# Patient Record
Sex: MALE | Race: BLACK OR AFRICAN AMERICAN | NOT HISPANIC OR LATINO | URBAN - METROPOLITAN AREA
[De-identification: names, ages, dates, MRNs, and addresses within clinical notes are randomized per-mention and may not be internally consistent; named-entity substitution may affect disease eponyms.]

---

## 2017-03-01 ENCOUNTER — OUTPATIENT (OUTPATIENT)
Dept: OUTPATIENT SERVICES | Facility: HOSPITAL | Age: 32
LOS: 1 days | End: 2017-03-01
Payer: MEDICAID

## 2017-03-06 DIAGNOSIS — R69 ILLNESS, UNSPECIFIED: ICD-10-CM

## 2017-09-01 PROCEDURE — G9001: CPT

## 2018-09-01 ENCOUNTER — INPATIENT (INPATIENT)
Facility: HOSPITAL | Age: 33
LOS: 54 days | Discharge: HOME | End: 2018-10-26
Attending: PSYCHIATRY & NEUROLOGY | Admitting: PSYCHIATRY & NEUROLOGY
Payer: MEDICAID

## 2018-09-01 VITALS
OXYGEN SATURATION: 99 % | SYSTOLIC BLOOD PRESSURE: 143 MMHG | DIASTOLIC BLOOD PRESSURE: 94 MMHG | TEMPERATURE: 98 F | RESPIRATION RATE: 18 BRPM | HEART RATE: 82 BPM

## 2018-09-01 DIAGNOSIS — R69 ILLNESS, UNSPECIFIED: ICD-10-CM

## 2018-09-01 DIAGNOSIS — F25.0 SCHIZOAFFECTIVE DISORDER, BIPOLAR TYPE: ICD-10-CM

## 2018-09-01 LAB
ALBUMIN SERPL ELPH-MCNC: 4 G/DL — SIGNIFICANT CHANGE UP (ref 3.3–5)
ALP SERPL-CCNC: 135 U/L — HIGH (ref 40–120)
ALT FLD-CCNC: 30 U/L — SIGNIFICANT CHANGE UP (ref 4–41)
AMPHET UR-MCNC: NEGATIVE — SIGNIFICANT CHANGE UP
APAP SERPL-MCNC: < 15 UG/ML — LOW (ref 15–25)
APPEARANCE UR: CLEAR — SIGNIFICANT CHANGE UP
AST SERPL-CCNC: 36 U/L — SIGNIFICANT CHANGE UP (ref 4–40)
BARBITURATES UR SCN-MCNC: NEGATIVE — SIGNIFICANT CHANGE UP
BASOPHILS # BLD AUTO: 0.03 K/UL — SIGNIFICANT CHANGE UP (ref 0–0.2)
BASOPHILS NFR BLD AUTO: 0.5 % — SIGNIFICANT CHANGE UP (ref 0–2)
BENZODIAZ UR-MCNC: NEGATIVE — SIGNIFICANT CHANGE UP
BILIRUB SERPL-MCNC: < 0.2 MG/DL — LOW (ref 0.2–1.2)
BILIRUB UR-MCNC: NEGATIVE — SIGNIFICANT CHANGE UP
BLOOD UR QL VISUAL: NEGATIVE — SIGNIFICANT CHANGE UP
BUN SERPL-MCNC: 16 MG/DL — SIGNIFICANT CHANGE UP (ref 7–23)
CALCIUM SERPL-MCNC: 8.9 MG/DL — SIGNIFICANT CHANGE UP (ref 8.4–10.5)
CANNABINOIDS UR-MCNC: NEGATIVE — SIGNIFICANT CHANGE UP
CHLORIDE SERPL-SCNC: 106 MMOL/L — SIGNIFICANT CHANGE UP (ref 98–107)
CO2 SERPL-SCNC: 22 MMOL/L — SIGNIFICANT CHANGE UP (ref 22–31)
COCAINE METAB.OTHER UR-MCNC: NEGATIVE — SIGNIFICANT CHANGE UP
COLOR SPEC: SIGNIFICANT CHANGE UP
CREAT SERPL-MCNC: 0.92 MG/DL — SIGNIFICANT CHANGE UP (ref 0.5–1.3)
EOSINOPHIL # BLD AUTO: 0.14 K/UL — SIGNIFICANT CHANGE UP (ref 0–0.5)
EOSINOPHIL NFR BLD AUTO: 2.4 % — SIGNIFICANT CHANGE UP (ref 0–6)
ETHANOL BLD-MCNC: < 10 MG/DL — SIGNIFICANT CHANGE UP
GLUCOSE SERPL-MCNC: 104 MG/DL — HIGH (ref 70–99)
GLUCOSE UR-MCNC: NEGATIVE — SIGNIFICANT CHANGE UP
HCT VFR BLD CALC: 39.2 % — SIGNIFICANT CHANGE UP (ref 39–50)
HGB BLD-MCNC: 12.4 G/DL — LOW (ref 13–17)
IMM GRANULOCYTES # BLD AUTO: 0.01 # — SIGNIFICANT CHANGE UP
IMM GRANULOCYTES NFR BLD AUTO: 0.2 % — SIGNIFICANT CHANGE UP (ref 0–1.5)
KETONES UR-MCNC: NEGATIVE — SIGNIFICANT CHANGE UP
LEUKOCYTE ESTERASE UR-ACNC: NEGATIVE — SIGNIFICANT CHANGE UP
LYMPHOCYTES # BLD AUTO: 2.43 K/UL — SIGNIFICANT CHANGE UP (ref 1–3.3)
LYMPHOCYTES # BLD AUTO: 40.8 % — SIGNIFICANT CHANGE UP (ref 13–44)
MCHC RBC-ENTMCNC: 26.4 PG — LOW (ref 27–34)
MCHC RBC-ENTMCNC: 31.6 % — LOW (ref 32–36)
MCV RBC AUTO: 83.6 FL — SIGNIFICANT CHANGE UP (ref 80–100)
METHADONE UR-MCNC: NEGATIVE — SIGNIFICANT CHANGE UP
MONOCYTES # BLD AUTO: 0.61 K/UL — SIGNIFICANT CHANGE UP (ref 0–0.9)
MONOCYTES NFR BLD AUTO: 10.3 % — SIGNIFICANT CHANGE UP (ref 2–14)
NEUTROPHILS # BLD AUTO: 2.73 K/UL — SIGNIFICANT CHANGE UP (ref 1.8–7.4)
NEUTROPHILS NFR BLD AUTO: 45.8 % — SIGNIFICANT CHANGE UP (ref 43–77)
NITRITE UR-MCNC: NEGATIVE — SIGNIFICANT CHANGE UP
NRBC # FLD: 0 — SIGNIFICANT CHANGE UP
OPIATES UR-MCNC: NEGATIVE — SIGNIFICANT CHANGE UP
OXYCODONE UR-MCNC: NEGATIVE — SIGNIFICANT CHANGE UP
PCP UR-MCNC: NEGATIVE — SIGNIFICANT CHANGE UP
PH UR: 6 — SIGNIFICANT CHANGE UP (ref 5–8)
PLATELET # BLD AUTO: 283 K/UL — SIGNIFICANT CHANGE UP (ref 150–400)
PMV BLD: 10.6 FL — SIGNIFICANT CHANGE UP (ref 7–13)
POTASSIUM SERPL-MCNC: 4.2 MMOL/L — SIGNIFICANT CHANGE UP (ref 3.5–5.3)
POTASSIUM SERPL-SCNC: 4.2 MMOL/L — SIGNIFICANT CHANGE UP (ref 3.5–5.3)
PROT SERPL-MCNC: 7.7 G/DL — SIGNIFICANT CHANGE UP (ref 6–8.3)
PROT UR-MCNC: NEGATIVE — SIGNIFICANT CHANGE UP
RBC # BLD: 4.69 M/UL — SIGNIFICANT CHANGE UP (ref 4.2–5.8)
RBC # FLD: 15.6 % — HIGH (ref 10.3–14.5)
SALICYLATES SERPL-MCNC: < 5 MG/DL — LOW (ref 15–30)
SODIUM SERPL-SCNC: 142 MMOL/L — SIGNIFICANT CHANGE UP (ref 135–145)
SP GR SPEC: 1.02 — SIGNIFICANT CHANGE UP (ref 1–1.04)
TSH SERPL-MCNC: 1.51 UIU/ML — SIGNIFICANT CHANGE UP (ref 0.27–4.2)
UROBILINOGEN FLD QL: NORMAL — SIGNIFICANT CHANGE UP
WBC # BLD: 5.95 K/UL — SIGNIFICANT CHANGE UP (ref 3.8–10.5)
WBC # FLD AUTO: 5.95 K/UL — SIGNIFICANT CHANGE UP (ref 3.8–10.5)

## 2018-09-01 PROCEDURE — 99285 EMERGENCY DEPT VISIT HI MDM: CPT

## 2018-09-01 RX ORDER — CEPHALEXIN 500 MG
500 CAPSULE ORAL EVERY 12 HOURS
Qty: 0 | Refills: 0 | Status: DISCONTINUED | OUTPATIENT
Start: 2018-09-01 | End: 2018-09-01

## 2018-09-01 NOTE — ED BEHAVIORAL HEALTH ASSESSMENT NOTE - PAST PSYCHOTROPIC MEDICATION
Invega sustena 234mg, Risperdal Consta; long history of refusing oral meds like zyprexa, risperdal, depakote
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
## 2018-09-01 NOTE — ED BEHAVIORAL HEALTH ASSESSMENT NOTE - AXIS IV
Problem related to social environment/Educational problems/Occupational problems/Problems with access to healthcare services

## 2018-09-01 NOTE — ED BEHAVIORAL HEALTH ASSESSMENT NOTE - PSYCHIATRIC ISSUES AND PLAN (INCLUDE STANDING AND PRN MEDICATION)
psychosis: start risperidone 1mg hs, consider restarting Invega COLLINS, Haldol 5mg PO q 6 hrs prn agitation; Ativan 2mg PO q 6hrs prn anxiety/agitation, Diphenhydramine 50mg PO q 6hr prn EPS, insomnia or agitation.      Haldol 5mg IM; Ativan 2mg IM, Diphenhydramine 50mg IM once prn severe agitation (combativeness, assaultive behavior) after notification of a prescribing clinician.

## 2018-09-01 NOTE — ED PROVIDER NOTE - PROGRESS NOTE DETAILS
CATHIE MARINO MD: This patient was never directly seen by me.  I never performed a face to face HPI, ROS, PMHX or PE.  I was not involved in the MDM or the final disposition of the patient.  I was available for consultation during at least a portion of the patients stay in the Emergency Department.  I am co-signing the note of the nurse practitioner as per hospital protocol.
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
## 2018-09-01 NOTE — ED ADULT NURSE NOTE - CHIEF COMPLAINT QUOTE
Pt ambulatory to triage via [employer] as per EMT" His mother called reporting he has schizophrenia and is non compliant with med he is also delusional." Pt calm and cooperative in triage, axo3 calm and cooperative pt st" I stopped taking meds because I graduated from [facility] program and they said I could stopped." Denies drug etoh use. denies si/Hi.

## 2018-09-01 NOTE — ED BEHAVIORAL HEALTH ASSESSMENT NOTE - RISK ASSESSMENT
He is at high risk of danger of to self and others secondary to acute psychosis and recent aggression, poor sleep and medication non compliance, he requires inpatient psychiatric hospitalization for treatment and stabilization, will be admitted 9.39. No need for 1;1 CO and no suicidal or homicidal intent. Transfer to [facility] with EMS and buckle guard, to be removed on arrival.

## 2018-09-01 NOTE — ED PROVIDER NOTE - OBJECTIVE STATEMENT
32 y/o M hx  Schizophrenia, Bipolar   BIBA  malodorous w c/o increase anxiousness , aggression and bizarre behaviour secondary to medication non compliance. Patient  appears paranoid and disorganized. Admits to medication non compliance.   Denies falling, punching or kicking any objects. Denies SI/HI/AH/VH. Denies  pain, dizziness,  SOB , fever, chills, chest/abdominal   discomfort.  Denies recent use of  alcohol or illicit drugs.

## 2018-09-01 NOTE — ED BEHAVIORAL HEALTH ASSESSMENT NOTE - DESCRIPTION
pt calm and cooperative in the ER. obesity pt is single, unemployed, no children, resides with his family. in good behavioral control  ICU Vital Signs Last 24 Hrs  T(C): 36.6 (01 Sep 2018 20:54), Max: 36.6 (01 Sep 2018 20:54)  T(F): 97.8 (01 Sep 2018 20:54), Max: 97.8 (01 Sep 2018 20:54)  HR: 82 (01 Sep 2018 20:54) (82 - 82)  BP: 143/94 (01 Sep 2018 20:54) (143/94 - 143/94)  BP(mean): --  ABP: --  ABP(mean): --  RR: 18 (01 Sep 2018 20:54) (18 - 18)  SpO2: 99% (01 Sep 2018 20:54) (99% - 99%)

## 2018-09-01 NOTE — ED BEHAVIORAL HEALTH ASSESSMENT NOTE - OTHER PAST PSYCHIATRIC HISTORY (INCLUDE DETAILS REGARDING ONSET, COURSE OF ILLNESS, INPATIENT/OUTPATIENT TREATMENT)
hospitalized 6 times; last time was at [facility] in 6/09 (also at [facility], [facility], and [place],). First hospitalized in 2008; 3 hospitalizations within 2 months during summer 2009. Attended  [facility] and saw Dr. Pedraza (psychiatrist) & dr. Barton (psychologist) x 2 yrs.  Patient attended day program at [facility] following last psych admission, but did not pursue as he felt patient's were too low functioning. Has been followed by ACT Team [phone] / [phone]  - no hx of suicidality / suicide attempts / substance abuse / aggression / violence / legal issues  - hx of ICM from [facility]; hx of attending [facility] hospitalized 6 times; last time was at [facility] in 6/09 (also at [facility], [facility], and [place],). First hospitalized in 2008; 3 hospitalizations within 2 months during summer 2009. Attended  [facility] and saw Dr. Pedraza (psychiatrist) & dr. Barton (psychologist) x 2 yrs.  Patient attended day program at [facility] following last psych admission, but did not pursue as he felt patient's were too low functioning. Has been followed by ACT Team [phone] / [phone]  - no hx of suicidality / suicide attempts / substance abuse   - hx of ICM from [facility]; hx of attending [facility]

## 2018-09-01 NOTE — ED PROVIDER NOTE - MEDICAL DECISION MAKING DETAILS
34 y/o M hx  Schizophrenia, Bipolar   Labs, Urine Tox/UA, EKG.   Medical evaluation performed. There is no clinical evidence of intoxication or any acute medical problem requiring immediate intervention. Patient is awaiting psychiatric consultation. Final disposition will be determined by psychiatrist. Recommend Inpatient Psychiatric admission
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
## 2018-09-01 NOTE — ED ADULT TRIAGE NOTE - CHIEF COMPLAINT QUOTE
Pt ambulatory to triage via [employer] as per EMT" His mother called reporting he has schizophrenia and is non compliant with med he is also delusional." Pt calm and cooperative in triage, axo3 calm and cooperative pt st" I stopped taking meds because I graduated from [facility] program and they said I could stopped." Denies drug etoh use. denies si/Hi.

## 2018-09-02 DIAGNOSIS — F25.0 SCHIZOAFFECTIVE DISORDER, BIPOLAR TYPE: ICD-10-CM

## 2018-09-02 PROCEDURE — 99222 1ST HOSP IP/OBS MODERATE 55: CPT

## 2018-09-02 RX ORDER — RISPERIDONE 4 MG/1
2 TABLET ORAL AT BEDTIME
Qty: 0 | Refills: 0 | Status: DISCONTINUED | OUTPATIENT
Start: 2018-09-02 | End: 2018-09-12

## 2018-09-02 RX ORDER — RISPERIDONE 4 MG/1
1 TABLET ORAL AT BEDTIME
Qty: 0 | Refills: 0 | Status: DISCONTINUED | OUTPATIENT
Start: 2018-09-02 | End: 2018-09-02

## 2018-09-02 RX ORDER — HALOPERIDOL DECANOATE 100 MG/ML
5 INJECTION INTRAMUSCULAR ONCE
Qty: 0 | Refills: 0 | Status: DISCONTINUED | OUTPATIENT
Start: 2018-09-02 | End: 2018-10-26

## 2018-09-02 RX ORDER — HALOPERIDOL DECANOATE 100 MG/ML
5 INJECTION INTRAMUSCULAR EVERY 6 HOURS
Qty: 0 | Refills: 0 | Status: DISCONTINUED | OUTPATIENT
Start: 2018-09-02 | End: 2018-10-26

## 2018-09-02 RX ORDER — DIPHENHYDRAMINE HCL 50 MG
50 CAPSULE ORAL ONCE
Qty: 0 | Refills: 0 | Status: DISCONTINUED | OUTPATIENT
Start: 2018-09-02 | End: 2018-10-26

## 2018-09-02 RX ORDER — DIPHENHYDRAMINE HCL 50 MG
50 CAPSULE ORAL EVERY 6 HOURS
Qty: 0 | Refills: 0 | Status: DISCONTINUED | OUTPATIENT
Start: 2018-09-02 | End: 2018-10-26

## 2018-09-02 RX ORDER — DIPHENHYDRAMINE HCL 50 MG
50 CAPSULE ORAL EVERY 4 HOURS
Qty: 0 | Refills: 0 | Status: DISCONTINUED | OUTPATIENT
Start: 2018-09-02 | End: 2018-10-26

## 2018-09-02 RX ADMIN — Medication 50 MILLIGRAM(S): at 20:27

## 2018-09-03 PROCEDURE — 99232 SBSQ HOSP IP/OBS MODERATE 35: CPT

## 2018-09-04 RX ORDER — DIPHENHYDRAMINE HCL 50 MG
50 CAPSULE ORAL ONCE
Qty: 0 | Refills: 0 | Status: COMPLETED | OUTPATIENT
Start: 2018-09-04 | End: 2018-09-04

## 2018-09-04 RX ADMIN — Medication 50 MILLIGRAM(S): at 20:39

## 2018-09-05 RX ADMIN — Medication 50 MILLIGRAM(S): at 21:17

## 2018-09-06 RX ADMIN — Medication 50 MILLIGRAM(S): at 20:21

## 2018-09-07 RX ADMIN — Medication 50 MILLIGRAM(S): at 21:15

## 2018-09-08 RX ORDER — ACETAMINOPHEN 500 MG
650 TABLET ORAL ONCE
Qty: 0 | Refills: 0 | Status: COMPLETED | OUTPATIENT
Start: 2018-09-08 | End: 2018-09-08

## 2018-09-08 RX ADMIN — Medication 50 MILLIGRAM(S): at 21:04

## 2018-09-08 RX ADMIN — Medication 650 MILLIGRAM(S): at 22:45

## 2018-09-08 RX ADMIN — Medication 650 MILLIGRAM(S): at 21:44

## 2018-09-09 RX ORDER — BENZOCAINE AND MENTHOL 5; 1 G/100ML; G/100ML
1 LIQUID ORAL
Qty: 0 | Refills: 0 | Status: DISCONTINUED | OUTPATIENT
Start: 2018-09-09 | End: 2018-10-26

## 2018-09-09 RX ADMIN — Medication 200 MILLIGRAM(S): at 18:37

## 2018-09-09 RX ADMIN — Medication 200 MILLIGRAM(S): at 23:00

## 2018-09-09 RX ADMIN — Medication 200 MILLIGRAM(S): at 08:08

## 2018-09-09 RX ADMIN — BENZOCAINE AND MENTHOL 1 LOZENGE: 5; 1 LIQUID ORAL at 12:02

## 2018-09-09 RX ADMIN — BENZOCAINE AND MENTHOL 1 LOZENGE: 5; 1 LIQUID ORAL at 08:08

## 2018-09-09 RX ADMIN — Medication 200 MILLIGRAM(S): at 12:02

## 2018-09-09 RX ADMIN — BENZOCAINE AND MENTHOL 1 LOZENGE: 5; 1 LIQUID ORAL at 18:37

## 2018-09-09 RX ADMIN — BENZOCAINE AND MENTHOL 1 LOZENGE: 5; 1 LIQUID ORAL at 21:12

## 2018-09-10 RX ADMIN — BENZOCAINE AND MENTHOL 1 LOZENGE: 5; 1 LIQUID ORAL at 05:00

## 2018-09-10 RX ADMIN — BENZOCAINE AND MENTHOL 1 LOZENGE: 5; 1 LIQUID ORAL at 14:55

## 2018-09-10 RX ADMIN — Medication 50 MILLIGRAM(S): at 20:37

## 2018-09-10 RX ADMIN — BENZOCAINE AND MENTHOL 1 LOZENGE: 5; 1 LIQUID ORAL at 20:37

## 2018-09-10 RX ADMIN — Medication 200 MILLIGRAM(S): at 20:37

## 2018-09-10 RX ADMIN — Medication 200 MILLIGRAM(S): at 05:00

## 2018-09-11 RX ADMIN — Medication 50 MILLIGRAM(S): at 20:13

## 2018-09-11 RX ADMIN — Medication 200 MILLIGRAM(S): at 20:14

## 2018-09-12 RX ORDER — ARIPIPRAZOLE 15 MG/1
5 TABLET ORAL DAILY
Qty: 0 | Refills: 0 | Status: DISCONTINUED | OUTPATIENT
Start: 2018-09-12 | End: 2018-09-20

## 2018-09-13 RX ADMIN — Medication 50 MILLIGRAM(S): at 22:16

## 2018-09-19 RX ADMIN — Medication 50 MILLIGRAM(S): at 20:36

## 2018-09-20 PROCEDURE — 99232 SBSQ HOSP IP/OBS MODERATE 35: CPT

## 2018-09-20 RX ORDER — TUBERCULIN PURIFIED PROTEIN DERIVATIVE 5 [IU]/.1ML
5 INJECTION, SOLUTION INTRADERMAL ONCE
Qty: 0 | Refills: 0 | Status: COMPLETED | OUTPATIENT
Start: 2018-09-20 | End: 2018-09-22

## 2018-09-20 RX ORDER — ARIPIPRAZOLE 15 MG/1
10 TABLET ORAL DAILY
Qty: 0 | Refills: 0 | Status: DISCONTINUED | OUTPATIENT
Start: 2018-09-20 | End: 2018-09-21

## 2018-09-20 RX ADMIN — TUBERCULIN PURIFIED PROTEIN DERIVATIVE 5 UNIT(S): 5 INJECTION, SOLUTION INTRADERMAL at 13:03

## 2018-09-20 RX ADMIN — ARIPIPRAZOLE 5 MILLIGRAM(S): 15 TABLET ORAL at 11:37

## 2018-09-21 PROCEDURE — 99232 SBSQ HOSP IP/OBS MODERATE 35: CPT

## 2018-09-21 RX ORDER — ARIPIPRAZOLE 15 MG/1
15 TABLET ORAL DAILY
Qty: 0 | Refills: 0 | Status: DISCONTINUED | OUTPATIENT
Start: 2018-09-22 | End: 2018-09-24

## 2018-09-21 RX ADMIN — ARIPIPRAZOLE 10 MILLIGRAM(S): 15 TABLET ORAL at 08:17

## 2018-09-22 RX ADMIN — ARIPIPRAZOLE 15 MILLIGRAM(S): 15 TABLET ORAL at 08:12

## 2018-09-22 RX ADMIN — TUBERCULIN PURIFIED PROTEIN DERIVATIVE 5 UNIT(S): 5 INJECTION, SOLUTION INTRADERMAL at 15:13

## 2018-09-23 RX ADMIN — Medication 50 MILLIGRAM(S): at 20:07

## 2018-09-23 RX ADMIN — Medication 50 MILLIGRAM(S): at 03:19

## 2018-09-23 RX ADMIN — ARIPIPRAZOLE 15 MILLIGRAM(S): 15 TABLET ORAL at 08:10

## 2018-09-24 PROCEDURE — 99232 SBSQ HOSP IP/OBS MODERATE 35: CPT

## 2018-09-24 RX ORDER — ARIPIPRAZOLE 15 MG/1
20 TABLET ORAL DAILY
Qty: 0 | Refills: 0 | Status: DISCONTINUED | OUTPATIENT
Start: 2018-09-25 | End: 2018-10-19

## 2018-09-24 RX ADMIN — ARIPIPRAZOLE 15 MILLIGRAM(S): 15 TABLET ORAL at 08:14

## 2018-09-24 RX ADMIN — Medication 50 MILLIGRAM(S): at 21:13

## 2018-09-25 LAB
CHOLEST SERPL-MCNC: 217 MG/DL — HIGH (ref 120–199)
HBA1C BLD-MCNC: 5.6 % — SIGNIFICANT CHANGE UP (ref 4–5.6)
HDLC SERPL-MCNC: 50 MG/DL — SIGNIFICANT CHANGE UP (ref 35–55)
LIPID PNL WITH DIRECT LDL SERPL: 149 MG/DL — SIGNIFICANT CHANGE UP
TRIGL SERPL-MCNC: 152 MG/DL — HIGH (ref 10–149)

## 2018-09-25 PROCEDURE — 99232 SBSQ HOSP IP/OBS MODERATE 35: CPT

## 2018-09-25 RX ORDER — ARIPIPRAZOLE 15 MG/1
400 TABLET ORAL ONCE
Qty: 0 | Refills: 0 | Status: COMPLETED | OUTPATIENT
Start: 2018-09-26 | End: 2018-09-26

## 2018-09-25 RX ORDER — ARIPIPRAZOLE 15 MG/1
400 TABLET ORAL ONCE
Qty: 0 | Refills: 0 | Status: DISCONTINUED | OUTPATIENT
Start: 2018-09-25 | End: 2018-09-25

## 2018-09-25 RX ADMIN — Medication 50 MILLIGRAM(S): at 20:24

## 2018-09-25 RX ADMIN — ARIPIPRAZOLE 20 MILLIGRAM(S): 15 TABLET ORAL at 08:40

## 2018-09-26 RX ADMIN — ARIPIPRAZOLE 400 MILLIGRAM(S): 15 TABLET ORAL at 18:03

## 2018-09-26 RX ADMIN — Medication 50 MILLIGRAM(S): at 20:30

## 2018-09-26 RX ADMIN — ARIPIPRAZOLE 20 MILLIGRAM(S): 15 TABLET ORAL at 09:35

## 2018-09-27 RX ADMIN — Medication 50 MILLIGRAM(S): at 20:54

## 2018-09-27 RX ADMIN — ARIPIPRAZOLE 20 MILLIGRAM(S): 15 TABLET ORAL at 08:38

## 2018-09-28 LAB
ALBUMIN SERPL ELPH-MCNC: 4.5 G/DL — SIGNIFICANT CHANGE UP (ref 3.3–5)
ALP SERPL-CCNC: 113 U/L — SIGNIFICANT CHANGE UP (ref 40–120)
ALT FLD-CCNC: 41 U/L — SIGNIFICANT CHANGE UP (ref 4–41)
AST SERPL-CCNC: 33 U/L — SIGNIFICANT CHANGE UP (ref 4–40)
BASOPHILS # BLD AUTO: 0.02 K/UL — SIGNIFICANT CHANGE UP (ref 0–0.2)
BASOPHILS NFR BLD AUTO: 0.2 % — SIGNIFICANT CHANGE UP (ref 0–2)
BILIRUB SERPL-MCNC: 0.4 MG/DL — SIGNIFICANT CHANGE UP (ref 0.2–1.2)
BUN SERPL-MCNC: 11 MG/DL — SIGNIFICANT CHANGE UP (ref 7–23)
CALCIUM SERPL-MCNC: 9.5 MG/DL — SIGNIFICANT CHANGE UP (ref 8.4–10.5)
CHLORIDE SERPL-SCNC: 99 MMOL/L — SIGNIFICANT CHANGE UP (ref 98–107)
CHOLEST SERPL-MCNC: 191 MG/DL — SIGNIFICANT CHANGE UP (ref 120–199)
CO2 SERPL-SCNC: 27 MMOL/L — SIGNIFICANT CHANGE UP (ref 22–31)
CREAT SERPL-MCNC: 0.88 MG/DL — SIGNIFICANT CHANGE UP (ref 0.5–1.3)
EOSINOPHIL # BLD AUTO: 0.06 K/UL — SIGNIFICANT CHANGE UP (ref 0–0.5)
EOSINOPHIL NFR BLD AUTO: 0.7 % — SIGNIFICANT CHANGE UP (ref 0–6)
GLUCOSE SERPL-MCNC: 83 MG/DL — SIGNIFICANT CHANGE UP (ref 70–99)
HCT VFR BLD CALC: 41.9 % — SIGNIFICANT CHANGE UP (ref 39–50)
HDLC SERPL-MCNC: 51 MG/DL — SIGNIFICANT CHANGE UP (ref 35–55)
HGB BLD-MCNC: 13.3 G/DL — SIGNIFICANT CHANGE UP (ref 13–17)
IMM GRANULOCYTES # BLD AUTO: 0.02 # — SIGNIFICANT CHANGE UP
IMM GRANULOCYTES NFR BLD AUTO: 0.2 % — SIGNIFICANT CHANGE UP (ref 0–1.5)
LIPID PNL WITH DIRECT LDL SERPL: 133 MG/DL — SIGNIFICANT CHANGE UP
LYMPHOCYTES # BLD AUTO: 2.03 K/UL — SIGNIFICANT CHANGE UP (ref 1–3.3)
LYMPHOCYTES # BLD AUTO: 24.4 % — SIGNIFICANT CHANGE UP (ref 13–44)
MCHC RBC-ENTMCNC: 25.9 PG — LOW (ref 27–34)
MCHC RBC-ENTMCNC: 31.7 % — LOW (ref 32–36)
MCV RBC AUTO: 81.5 FL — SIGNIFICANT CHANGE UP (ref 80–100)
MONOCYTES # BLD AUTO: 0.81 K/UL — SIGNIFICANT CHANGE UP (ref 0–0.9)
MONOCYTES NFR BLD AUTO: 9.7 % — SIGNIFICANT CHANGE UP (ref 2–14)
NEUTROPHILS # BLD AUTO: 5.38 K/UL — SIGNIFICANT CHANGE UP (ref 1.8–7.4)
NEUTROPHILS NFR BLD AUTO: 64.8 % — SIGNIFICANT CHANGE UP (ref 43–77)
NRBC # FLD: 0 — SIGNIFICANT CHANGE UP
PLATELET # BLD AUTO: 312 K/UL — SIGNIFICANT CHANGE UP (ref 150–400)
PMV BLD: 10.8 FL — SIGNIFICANT CHANGE UP (ref 7–13)
POTASSIUM SERPL-MCNC: 4.2 MMOL/L — SIGNIFICANT CHANGE UP (ref 3.5–5.3)
POTASSIUM SERPL-SCNC: 4.2 MMOL/L — SIGNIFICANT CHANGE UP (ref 3.5–5.3)
PROT SERPL-MCNC: 8.5 G/DL — HIGH (ref 6–8.3)
RBC # BLD: 5.14 M/UL — SIGNIFICANT CHANGE UP (ref 4.2–5.8)
RBC # FLD: 14.6 % — HIGH (ref 10.3–14.5)
SODIUM SERPL-SCNC: 138 MMOL/L — SIGNIFICANT CHANGE UP (ref 135–145)
TRIGL SERPL-MCNC: 93 MG/DL — SIGNIFICANT CHANGE UP (ref 10–149)
TSH SERPL-MCNC: 0.81 UIU/ML — SIGNIFICANT CHANGE UP (ref 0.27–4.2)
WBC # BLD: 8.32 K/UL — SIGNIFICANT CHANGE UP (ref 3.8–10.5)
WBC # FLD AUTO: 8.32 K/UL — SIGNIFICANT CHANGE UP (ref 3.8–10.5)

## 2018-09-28 PROCEDURE — 93010 ELECTROCARDIOGRAM REPORT: CPT

## 2018-09-28 RX ADMIN — Medication 50 MILLIGRAM(S): at 20:35

## 2018-09-28 RX ADMIN — ARIPIPRAZOLE 20 MILLIGRAM(S): 15 TABLET ORAL at 08:25

## 2018-09-29 RX ADMIN — ARIPIPRAZOLE 20 MILLIGRAM(S): 15 TABLET ORAL at 08:21

## 2018-09-29 RX ADMIN — Medication 50 MILLIGRAM(S): at 20:23

## 2018-09-30 RX ADMIN — Medication 50 MILLIGRAM(S): at 21:14

## 2018-09-30 RX ADMIN — ARIPIPRAZOLE 20 MILLIGRAM(S): 15 TABLET ORAL at 09:36

## 2018-10-01 PROCEDURE — 99232 SBSQ HOSP IP/OBS MODERATE 35: CPT

## 2018-10-01 RX ADMIN — ARIPIPRAZOLE 20 MILLIGRAM(S): 15 TABLET ORAL at 08:04

## 2018-10-02 RX ADMIN — ARIPIPRAZOLE 20 MILLIGRAM(S): 15 TABLET ORAL at 09:46

## 2018-10-03 RX ADMIN — ARIPIPRAZOLE 20 MILLIGRAM(S): 15 TABLET ORAL at 08:25

## 2018-10-03 RX ADMIN — Medication 50 MILLIGRAM(S): at 02:10

## 2018-10-04 RX ADMIN — ARIPIPRAZOLE 20 MILLIGRAM(S): 15 TABLET ORAL at 08:05

## 2018-10-04 RX ADMIN — Medication 50 MILLIGRAM(S): at 21:04

## 2018-10-05 LAB
HIV COMBO RESULT: SIGNIFICANT CHANGE UP
HIV1+2 AB SPEC QL: SIGNIFICANT CHANGE UP

## 2018-10-05 RX ORDER — DIPHENHYDRAMINE HCL 50 MG
50 CAPSULE ORAL AT BEDTIME
Qty: 0 | Refills: 0 | Status: DISCONTINUED | OUTPATIENT
Start: 2018-10-05 | End: 2018-10-09

## 2018-10-05 RX ADMIN — ARIPIPRAZOLE 20 MILLIGRAM(S): 15 TABLET ORAL at 08:16

## 2018-10-05 RX ADMIN — Medication 50 MILLIGRAM(S): at 21:07

## 2018-10-06 RX ORDER — IBUPROFEN 200 MG
400 TABLET ORAL ONCE
Qty: 0 | Refills: 0 | Status: COMPLETED | OUTPATIENT
Start: 2018-10-06 | End: 2018-10-06

## 2018-10-06 RX ORDER — ACETAMINOPHEN 500 MG
650 TABLET ORAL ONCE
Qty: 0 | Refills: 0 | Status: DISCONTINUED | OUTPATIENT
Start: 2018-10-06 | End: 2018-10-26

## 2018-10-06 RX ADMIN — Medication 400 MILLIGRAM(S): at 18:31

## 2018-10-06 RX ADMIN — Medication 50 MILLIGRAM(S): at 20:27

## 2018-10-06 RX ADMIN — ARIPIPRAZOLE 20 MILLIGRAM(S): 15 TABLET ORAL at 10:04

## 2018-10-06 RX ADMIN — Medication 400 MILLIGRAM(S): at 10:57

## 2018-10-07 RX ADMIN — ARIPIPRAZOLE 20 MILLIGRAM(S): 15 TABLET ORAL at 08:15

## 2018-10-07 RX ADMIN — Medication 50 MILLIGRAM(S): at 20:38

## 2018-10-08 RX ADMIN — ARIPIPRAZOLE 20 MILLIGRAM(S): 15 TABLET ORAL at 08:30

## 2018-10-08 RX ADMIN — Medication 50 MILLIGRAM(S): at 20:29

## 2018-10-09 RX ADMIN — Medication 1 MILLIGRAM(S): at 21:24

## 2018-10-09 RX ADMIN — ARIPIPRAZOLE 20 MILLIGRAM(S): 15 TABLET ORAL at 08:58

## 2018-10-10 RX ADMIN — Medication 1 MILLIGRAM(S): at 21:09

## 2018-10-10 RX ADMIN — Medication 50 MILLIGRAM(S): at 21:09

## 2018-10-10 RX ADMIN — ARIPIPRAZOLE 20 MILLIGRAM(S): 15 TABLET ORAL at 09:47

## 2018-10-11 RX ADMIN — Medication 50 MILLIGRAM(S): at 20:13

## 2018-10-11 RX ADMIN — Medication 1 MILLIGRAM(S): at 20:13

## 2018-10-11 RX ADMIN — ARIPIPRAZOLE 20 MILLIGRAM(S): 15 TABLET ORAL at 08:24

## 2018-10-12 RX ADMIN — Medication 1 MILLIGRAM(S): at 21:32

## 2018-10-12 RX ADMIN — ARIPIPRAZOLE 20 MILLIGRAM(S): 15 TABLET ORAL at 08:19

## 2018-10-13 RX ADMIN — ARIPIPRAZOLE 20 MILLIGRAM(S): 15 TABLET ORAL at 09:10

## 2018-10-13 RX ADMIN — Medication 1 MILLIGRAM(S): at 21:02

## 2018-10-14 RX ADMIN — ARIPIPRAZOLE 20 MILLIGRAM(S): 15 TABLET ORAL at 09:37

## 2018-10-14 RX ADMIN — Medication 1 MILLIGRAM(S): at 21:35

## 2018-10-15 RX ADMIN — ARIPIPRAZOLE 20 MILLIGRAM(S): 15 TABLET ORAL at 08:43

## 2018-10-15 RX ADMIN — Medication 1 MILLIGRAM(S): at 20:08

## 2018-10-15 RX ADMIN — Medication 50 MILLIGRAM(S): at 20:07

## 2018-10-16 RX ADMIN — Medication 50 MILLIGRAM(S): at 21:21

## 2018-10-16 RX ADMIN — ARIPIPRAZOLE 20 MILLIGRAM(S): 15 TABLET ORAL at 08:04

## 2018-10-16 RX ADMIN — Medication 1 MILLIGRAM(S): at 21:21

## 2018-10-17 RX ADMIN — Medication 50 MILLIGRAM(S): at 20:33

## 2018-10-17 RX ADMIN — ARIPIPRAZOLE 20 MILLIGRAM(S): 15 TABLET ORAL at 09:13

## 2018-10-18 RX ADMIN — Medication 50 MILLIGRAM(S): at 08:34

## 2018-10-18 RX ADMIN — ARIPIPRAZOLE 20 MILLIGRAM(S): 15 TABLET ORAL at 08:35

## 2018-10-22 PROCEDURE — 72148 MRI LUMBAR SPINE W/O DYE: CPT | Mod: 26

## 2018-10-22 RX ADMIN — Medication 1 MILLIGRAM(S): at 16:07

## 2018-10-24 RX ORDER — ARIPIPRAZOLE 15 MG/1
400 TABLET ORAL ONCE
Qty: 0 | Refills: 0 | Status: COMPLETED | OUTPATIENT
Start: 2018-10-24 | End: 2018-10-24

## 2018-10-24 RX ADMIN — ARIPIPRAZOLE 400 MILLIGRAM(S): 15 TABLET ORAL at 19:17

## 2018-10-26 VITALS — TEMPERATURE: 97 F

## 2018-10-26 RX ORDER — ARIPIPRAZOLE 15 MG/1
400 TABLET ORAL
Qty: 400 | Refills: 0 | OUTPATIENT
Start: 2018-10-26 | End: 2018-11-24

## 2020-11-28 ENCOUNTER — EMERGENCY (EMERGENCY)
Facility: HOSPITAL | Age: 35
LOS: 1 days | Discharge: ANOTHER HOSPITAL | End: 2020-11-28
Attending: STUDENT IN AN ORGANIZED HEALTH CARE EDUCATION/TRAINING PROGRAM | Admitting: EMERGENCY MEDICINE
Payer: MEDICAID

## 2020-11-28 VITALS
DIASTOLIC BLOOD PRESSURE: 87 MMHG | TEMPERATURE: 98 F | RESPIRATION RATE: 18 BRPM | OXYGEN SATURATION: 97 % | HEART RATE: 75 BPM | SYSTOLIC BLOOD PRESSURE: 142 MMHG

## 2020-11-28 VITALS
DIASTOLIC BLOOD PRESSURE: 90 MMHG | OXYGEN SATURATION: 94 % | HEART RATE: 75 BPM | SYSTOLIC BLOOD PRESSURE: 143 MMHG | TEMPERATURE: 98 F | HEIGHT: 74 IN | RESPIRATION RATE: 17 BRPM

## 2020-11-28 DIAGNOSIS — F25.0 SCHIZOAFFECTIVE DISORDER, BIPOLAR TYPE: ICD-10-CM

## 2020-11-28 LAB
ALBUMIN SERPL ELPH-MCNC: 4.6 G/DL — SIGNIFICANT CHANGE UP (ref 3.3–5)
ALP SERPL-CCNC: 127 U/L — HIGH (ref 40–120)
ALT FLD-CCNC: 43 U/L — HIGH (ref 4–41)
AMPHET UR-MCNC: NEGATIVE — SIGNIFICANT CHANGE UP
ANION GAP SERPL CALC-SCNC: 15 MMO/L — HIGH (ref 7–14)
APAP SERPL-MCNC: < 15 UG/ML — LOW (ref 15–25)
APPEARANCE UR: CLEAR — SIGNIFICANT CHANGE UP
AST SERPL-CCNC: 43 U/L — HIGH (ref 4–40)
B PERT DNA SPEC QL NAA+PROBE: SIGNIFICANT CHANGE UP
BACTERIA # UR AUTO: NEGATIVE — SIGNIFICANT CHANGE UP
BARBITURATES UR SCN-MCNC: NEGATIVE — SIGNIFICANT CHANGE UP
BASOPHILS # BLD AUTO: 0.01 K/UL — SIGNIFICANT CHANGE UP (ref 0–0.2)
BASOPHILS NFR BLD AUTO: 0.2 % — SIGNIFICANT CHANGE UP (ref 0–2)
BENZODIAZ UR-MCNC: NEGATIVE — SIGNIFICANT CHANGE UP
BILIRUB SERPL-MCNC: 0.2 MG/DL — SIGNIFICANT CHANGE UP (ref 0.2–1.2)
BILIRUB UR-MCNC: NEGATIVE — SIGNIFICANT CHANGE UP
BLOOD UR QL VISUAL: NEGATIVE — SIGNIFICANT CHANGE UP
BUN SERPL-MCNC: 20 MG/DL — SIGNIFICANT CHANGE UP (ref 7–23)
C PNEUM DNA SPEC QL NAA+PROBE: SIGNIFICANT CHANGE UP
CALCIUM SERPL-MCNC: 9.6 MG/DL — SIGNIFICANT CHANGE UP (ref 8.4–10.5)
CANNABINOIDS UR-MCNC: NEGATIVE — SIGNIFICANT CHANGE UP
CHLORIDE SERPL-SCNC: 105 MMOL/L — SIGNIFICANT CHANGE UP (ref 98–107)
CO2 SERPL-SCNC: 20 MMOL/L — LOW (ref 22–31)
COCAINE METAB.OTHER UR-MCNC: NEGATIVE — SIGNIFICANT CHANGE UP
COLOR SPEC: SIGNIFICANT CHANGE UP
CREAT SERPL-MCNC: 0.81 MG/DL — SIGNIFICANT CHANGE UP (ref 0.5–1.3)
EOSINOPHIL # BLD AUTO: 0.08 K/UL — SIGNIFICANT CHANGE UP (ref 0–0.5)
EOSINOPHIL NFR BLD AUTO: 1.5 % — SIGNIFICANT CHANGE UP (ref 0–6)
ETHANOL BLD-MCNC: < 10 MG/DL — SIGNIFICANT CHANGE UP
FLUAV H1 2009 PAND RNA SPEC QL NAA+PROBE: SIGNIFICANT CHANGE UP
FLUAV H1 RNA SPEC QL NAA+PROBE: SIGNIFICANT CHANGE UP
FLUAV H3 RNA SPEC QL NAA+PROBE: SIGNIFICANT CHANGE UP
FLUAV SUBTYP SPEC NAA+PROBE: SIGNIFICANT CHANGE UP
FLUBV RNA SPEC QL NAA+PROBE: SIGNIFICANT CHANGE UP
GLUCOSE SERPL-MCNC: 91 MG/DL — SIGNIFICANT CHANGE UP (ref 70–99)
GLUCOSE UR-MCNC: NEGATIVE — SIGNIFICANT CHANGE UP
HADV DNA SPEC QL NAA+PROBE: SIGNIFICANT CHANGE UP
HCOV PNL SPEC NAA+PROBE: SIGNIFICANT CHANGE UP
HCT VFR BLD CALC: 42.9 % — SIGNIFICANT CHANGE UP (ref 39–50)
HGB BLD-MCNC: 14.1 G/DL — SIGNIFICANT CHANGE UP (ref 13–17)
HMPV RNA SPEC QL NAA+PROBE: SIGNIFICANT CHANGE UP
HPIV1 RNA SPEC QL NAA+PROBE: SIGNIFICANT CHANGE UP
HPIV2 RNA SPEC QL NAA+PROBE: SIGNIFICANT CHANGE UP
HPIV3 RNA SPEC QL NAA+PROBE: SIGNIFICANT CHANGE UP
HPIV4 RNA SPEC QL NAA+PROBE: SIGNIFICANT CHANGE UP
HYALINE CASTS # UR AUTO: NEGATIVE — SIGNIFICANT CHANGE UP
IMM GRANULOCYTES NFR BLD AUTO: 0.2 % — SIGNIFICANT CHANGE UP (ref 0–1.5)
KETONES UR-MCNC: NEGATIVE — SIGNIFICANT CHANGE UP
LEUKOCYTE ESTERASE UR-ACNC: NEGATIVE — SIGNIFICANT CHANGE UP
LYMPHOCYTES # BLD AUTO: 1.89 K/UL — SIGNIFICANT CHANGE UP (ref 1–3.3)
LYMPHOCYTES # BLD AUTO: 35.9 % — SIGNIFICANT CHANGE UP (ref 13–44)
MCHC RBC-ENTMCNC: 26.6 PG — LOW (ref 27–34)
MCHC RBC-ENTMCNC: 32.9 % — SIGNIFICANT CHANGE UP (ref 32–36)
MCV RBC AUTO: 80.9 FL — SIGNIFICANT CHANGE UP (ref 80–100)
METHADONE UR-MCNC: NEGATIVE — SIGNIFICANT CHANGE UP
MONOCYTES # BLD AUTO: 0.45 K/UL — SIGNIFICANT CHANGE UP (ref 0–0.9)
MONOCYTES NFR BLD AUTO: 8.6 % — SIGNIFICANT CHANGE UP (ref 2–14)
NEUTROPHILS # BLD AUTO: 2.82 K/UL — SIGNIFICANT CHANGE UP (ref 1.8–7.4)
NEUTROPHILS NFR BLD AUTO: 53.6 % — SIGNIFICANT CHANGE UP (ref 43–77)
NITRITE UR-MCNC: NEGATIVE — SIGNIFICANT CHANGE UP
NRBC # FLD: 0 K/UL — SIGNIFICANT CHANGE UP (ref 0–0)
OPIATES UR-MCNC: NEGATIVE — SIGNIFICANT CHANGE UP
OXYCODONE UR-MCNC: NEGATIVE — SIGNIFICANT CHANGE UP
PCP UR-MCNC: NEGATIVE — SIGNIFICANT CHANGE UP
PH UR: 6 — SIGNIFICANT CHANGE UP (ref 5–8)
PLATELET # BLD AUTO: 289 K/UL — SIGNIFICANT CHANGE UP (ref 150–400)
PMV BLD: 10.4 FL — SIGNIFICANT CHANGE UP (ref 7–13)
POTASSIUM SERPL-MCNC: 4.4 MMOL/L — SIGNIFICANT CHANGE UP (ref 3.5–5.3)
POTASSIUM SERPL-SCNC: 4.4 MMOL/L — SIGNIFICANT CHANGE UP (ref 3.5–5.3)
PROT SERPL-MCNC: 7.8 G/DL — SIGNIFICANT CHANGE UP (ref 6–8.3)
PROT UR-MCNC: 20 — SIGNIFICANT CHANGE UP
RAPID RVP RESULT: DETECTED
RBC # BLD: 5.3 M/UL — SIGNIFICANT CHANGE UP (ref 4.2–5.8)
RBC # FLD: 14.3 % — SIGNIFICANT CHANGE UP (ref 10.3–14.5)
RBC CASTS # UR COMP ASSIST: SIGNIFICANT CHANGE UP (ref 0–?)
RV+EV RNA SPEC QL NAA+PROBE: SIGNIFICANT CHANGE UP
SALICYLATES SERPL-MCNC: < 5 MG/DL — LOW (ref 15–30)
SARS-COV-2 RNA SPEC QL NAA+PROBE: DETECTED
SODIUM SERPL-SCNC: 140 MMOL/L — SIGNIFICANT CHANGE UP (ref 135–145)
SP GR SPEC: 1.03 — SIGNIFICANT CHANGE UP (ref 1–1.04)
SQUAMOUS # UR AUTO: SIGNIFICANT CHANGE UP
TSH SERPL-MCNC: 1.18 UIU/ML — SIGNIFICANT CHANGE UP (ref 0.27–4.2)
UROBILINOGEN FLD QL: NORMAL — SIGNIFICANT CHANGE UP
WBC # BLD: 5.26 K/UL — SIGNIFICANT CHANGE UP (ref 3.8–10.5)
WBC # FLD AUTO: 5.26 K/UL — SIGNIFICANT CHANGE UP (ref 3.8–10.5)
WBC UR QL: SIGNIFICANT CHANGE UP (ref 0–?)

## 2020-11-28 PROCEDURE — 93010 ELECTROCARDIOGRAM REPORT: CPT

## 2020-11-28 PROCEDURE — 99285 EMERGENCY DEPT VISIT HI MDM: CPT | Mod: GC

## 2020-11-28 PROCEDURE — 99285 EMERGENCY DEPT VISIT HI MDM: CPT | Mod: 25

## 2020-11-28 RX ORDER — HALOPERIDOL DECANOATE 100 MG/ML
5 INJECTION INTRAMUSCULAR ONCE
Refills: 0 | Status: COMPLETED | OUTPATIENT
Start: 2020-11-28 | End: 2020-11-28
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
## 2020-11-28 NOTE — ED BEHAVIORAL HEALTH ASSESSMENT NOTE - OTHER PAST PSYCHIATRIC HISTORY (INCLUDE DETAILS REGARDING ONSET, COURSE OF ILLNESS, INPATIENT/OUTPATIENT TREATMENT)
hospitalized 7 times, most recently at [facility] in 2018, (also at [facility], and [facility]). First hospitalized in 2008; 3 hospitalizations within 2 months during summer 2009. Attended  [facility] and saw Dr. Pedraza (psychiatrist) & dr. Barton (psychologist) x 2 yrs.  Patient attended day program at [facility] following last psych admission, but did not pursue as he felt patient's were too low functioning. Currently being followed by ACT Team [phone]  - no hx of suicidality / suicide attempts / substance abuse   - hx of ICM from [facility]; hx of attending [facility]

## 2020-11-28 NOTE — ED BEHAVIORAL HEALTH ASSESSMENT NOTE - PSYCHIATRIC ISSUES AND PLAN (INCLUDE STANDING AND PRN MEDICATION)
start patient on abilify 5mg daily. PRN: haldol 5mg/ativan 2mg/benadryl 50mg PO/IM q6h PRN for agitation, trazodone 100mg PRN qhs for insomnia

## 2020-11-28 NOTE — ED PROVIDER NOTE - CPE EDP RESP NORM
Patient is a 29y old  Female who presents with a chief complaint of epigastric pain with nausea, vomiting (01 Aug 2018 02:03)    HPI:  30 yo F w/ PMH of PCOS, HTN, connective tissue disease, anxiety, and depression who recently came to the ED on  for one episode of vomiting and right sided abdominal pain that started 8 days prior and was discharged and was told to follow up with GI physician. Pt saw Dr. Alcira Valdez ([id number]) for the now epigastric pain and had an episode of nonbilious nonbloody vomiting in the office and was sent to the ED. The epigastric pain feels like a spasm and is non radiating. It is worsened with food and laying flat and it improves with sitting up. Ranitidine and famotidine did not improve the symptoms but compazine helped for a short period of time. Pt denies any fever, CP, or change in bowel movement.   Pt recently got a nerve ablation procedure in her back on  and received 2 doses of steroids. Here noted with pyuria was given IV rocephin for possible UTI no urinary sxs, no diarrhea, afebrile.       PMH: as above  PSH: as above  Meds: per reconciliation sheet, noted below  MEDICATIONS  (STANDING):  cloNIDine 0.1 milliGRAM(s) Oral two times a day  DULoxetine 60 milliGRAM(s) Oral daily  gabapentin Oral Tab/Cap - Peds 600 milliGRAM(s) Oral at bedtime  lamoTRIgine 200 milliGRAM(s) Oral at bedtime  lidocaine 2% Gel 1 Application(s) Topical three times a day  lisinopril 20 milliGRAM(s) Oral two times a day  pantoprazole   Suspension 40 milliGRAM(s) Oral daily  senna 2 Tablet(s) Oral at bedtime  sodium chloride 0.9%. 1200 milliLiter(s) (100 mL/Hr) IV Continuous <Continuous>    Allergies    No Known Drug Allergies  peanuts (Unknown)  Tree Nuts (Unknown)    Intolerances    morphine (Pruritus)    Social: no smoking, no alcohol, no illegal drugs; no recent travel, no exposure to TB  FAMILY HISTORY:  No pertinent family history in first degree relatives     no history of premature cardiovascular disease in first degree relatives    ROS: the patient denies fever, no chills, no HA, no dizziness, no sore throat, no blurry vision, no CP, no palpitations, no SOB, no cough, no abdominal pain, no diarrhea, no N/V, no dysuria, no leg pain, no claudication, no rash, no joint aches, no rectal pain or bleeding, no night sweats  All other systems reviewed and are negative    Vital Signs Last 24 Hrs  T(C): 37.4 (01 Aug 2018 10:22), Max: 37.4 (01 Aug 2018 05:10)  T(F): 99.3 (01 Aug 2018 10:22), Max: 99.4 (01 Aug 2018 05:10)  HR: 102 (01 Aug 2018 10:22) (94 - 116)  BP: 144/90 (01 Aug 2018 10:22) (138/78 - 176/98)  BP(mean): --  RR: 18 (01 Aug 2018 10:22) (15 - 18)  SpO2: 95% (01 Aug 2018 10:22) (95% - 99%)  Daily Height in cm: 170.18 (2018 18:32)        PE:  Constitutional: frail looking  HEENT: NC/AT, EOMI, PERRLA, conjunctivae clear; ears and nose atraumatic; pharynx benign  Neck: supple; thyroid not palpable  Back: no tenderness  Respiratory: respiratory effort normal; clear to auscultation  Cardiovascular: S1S2 regular, no murmurs  Abdomen: soft, tender, not distended, positive BS; liver and spleen WNL  Genitourinary: no suprapubic tenderness  Lymphatic: no LN palpable  Musculoskeletal: no muscle tenderness, no joint swelling or tenderness  Extremities: no pedal edema  Neurological/ Psychiatric: AxOx3, Judgement and insight normal;  moving all extremities  Skin: no rashes; no palpable lesions    Labs: all available labs reviewed                        11 )-----------( 423      ( 01 Aug 2018 05:39 )             36.9     08-01    138  |  106  |  14  ----------------------------<  85  4.2   |  23  |  0.77    Ca    8.9      01 Aug 2018 05:39    TPro  7.7  /  Alb  2.9<L>  /  TBili  0.4  /  DBili  <0.1  /  AST  45<H>  /  ALT  42  /  AlkPhos  73  08-01     LIVER FUNCTIONS - ( 01 Aug 2018 05:39 )  Alb: 2.9 g/dL / Pro: 7.7 gm/dL / ALK PHOS: 73 U/L / ALT: 42 U/L / AST: 45 U/L / GGT: x           Urinalysis Basic - ( 01 Aug 2018 04:00 )    Color: Yellow / Appearance: Clear / S.020 / pH: x  Gluc: x / Ketone: Negative  / Bili: Negative / Urobili: Negative mg/dL   Blood: x / Protein: 100 mg/dL / Nitrite: Negative   Leuk Esterase: Trace / RBC: 3-5 /HPF / WBC 3-5   Sq Epi: x / Non Sq Epi: Few / Bacteria: Few    Culture - Urine (18 @ 21:19)    Specimen Source: .Urine None    Culture Results:   <10,000 CFU/ml  Normal Urogenital koki present          Radiology: all available radiological tests reviewed  < from: CT Abdomen and Pelvis w/ IV Cont (18 @ 23:09) >    EXAM:  CT ABDOMEN AND PELVIS IC                            PROCEDURE DATE:  2018          INTERPRETATION:  Abdominal/Pelvic CT    2018 11:46 PM    Indication: Right-sided abdominal pain, nausea and vomiting, elevated   white count    Technique: Axial images were obtained following IV contrast from the lung   bases through pubic symphysis.  90 cc of Omnipaque 350 was administered   intravenously without complication and 10 cc was discarded.  Reformatted   coronal and sagittal images are submitted.    Comparison: CT of 2017    FINDINGS:    LUNG BASES:  There are no pleural effusions.  PERITONEUM:  There is no free air or focal collection.  No free fluid.  LIVER: Normal.  SPLEEN: Normal.  GALLBLADDER: Cholecystectomy.  BILIARY TREE: Unremarkable.  PANCREAS: Normal.  ADRENAL GLANDS: Normal.  KIDNEYS: Normal.  BOWEL: The stomach is incompletely distended.  There is no small bowel   obstruction, focal bowel wall thickening or diverticulitis. The appendix   is unremarkable.    URINARY BLADDER: Collapsed.  PELVIC ORGANS: There are bilateral ovarian cysts, measuring 5 x 4 cm on   the right and 3.8 x 3.2 cm on the left.    There is no significant adenopathy.  VASCULATURE: Unremarkable.  RETROPERITONEUM:  There is no mass.  BONES: Unremarkable.  ABDOMINAL WALL: Unremarkable.    IMPRESSION:    No explanation for abdominal pain on this CT.  Bilateral renal cysts, measuring up to 5 cm in the right and 3.8 cm on   the left. Recommend follow-up with pelvic ultrasound in 4-6 weeks.  Status post cholecystectomy without biliary dilatation or small bowel   obstruction.  Normal appendix.        Advanced directives addressed: full resuscitation normal...

## 2020-11-28 NOTE — ED ADULT NURSE NOTE - OBJECTIVE STATEMENT
Pt reports that her mother called EMS on him when he was just trying to help her carry bags up the stairs.  Pt. denies SI/HI, AH/VH.  Pt has a psych history of schizophrenia and Bipolar.  Pt is calm and cooperative.

## 2020-11-28 NOTE — ED ADULT NURSE REASSESSMENT NOTE - NS ED NURSE REASSESS COMMENT FT1
pt brought from  to  22, covid +, voluntary ADM to [facility], as per [facility] report to SO/EMS, EMS en route, pt remains calm/cooperative @ this time, awaiting EMs, will continue to monitor.
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
## 2020-11-28 NOTE — ED ADULT TRIAGE NOTE - NS ED TRIAGE HISTORIAN
-Continue Metoprolol  -Will consider amio if hypotension and rate control becomes an issue    
Patient
Warm
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
## 2020-11-28 NOTE — ED BEHAVIORAL HEALTH ASSESSMENT NOTE - PAST PSYCHOTROPIC MEDICATION
most recently on abilify, abilify yasmani. Has also been on Invega sustena 234mg, Risperdal Consta; long history of refusing oral meds like zyprexa, risperdal, depakote

## 2020-11-28 NOTE — ED BEHAVIORAL HEALTH ASSESSMENT NOTE - DESCRIPTION
Patient has been in good behavioral control.     Vital Signs Last 24 Hrs  T(C): 36.6 (28 Nov 2020 13:58), Max: 36.6 (28 Nov 2020 13:58)  T(F): 97.8 (28 Nov 2020 13:58), Max: 97.8 (28 Nov 2020 13:58)  HR: 75 (28 Nov 2020 13:58) (75 - 75)  BP: 143/90 (28 Nov 2020 13:58) (143/90 - 143/90)  BP(mean): --  RR: 17 (28 Nov 2020 13:58) (17 - 17)  SpO2: 94% (28 Nov 2020 13:58) (94% - 94%) obesity lives at home with mom and brother
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
## 2020-11-28 NOTE — ED PROVIDER NOTE - CLINICAL SUMMARY MEDICAL DECISION MAKING FREE TEXT BOX
Labs, Urine Tox/UA, EKG  Medical evaluation performed. There is no clinical evidence of intoxication or any acute medical problem requiring immediate intervention. Patient is awaiting psychiatric consultation. Final disposition will be determined by psychiatrist. 36 y/o M  hx Schizophrenia  Labs, Urine Tox/UA, EKG. COVID 19 positive- asymptomatic.    Medical evaluation performed. There is no clinical evidence of intoxication or any acute medical problem requiring immediate intervention. Patient is awaiting psychiatric consultation. Final disposition will be determined by psychiatrist.

## 2020-11-28 NOTE — ED PROVIDER NOTE - OBJECTIVE STATEMENT
36 y/o M  hx Schizophrenia BIBA secondary to agitation and acting out behavior at  home.  Mother states that patient has been spray painting  x's on the side walk and on the wall in the house . Patient appears paranoid and internally preoccupied.  . Mother admits to violent threats.   Denies any physical altercation. Denies pain, SOB, fever, chills, chest/abdominal discomfort. Denies use of alcohol or illicit drugs.  Denies SI/HI/AH/VH.  Denies falling , punching or kicking any   objects. No evidence of physical injuries, broken  skin or deformities.
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
## 2020-11-28 NOTE — ED BEHAVIORAL HEALTH ASSESSMENT NOTE - RISK ASSESSMENT
Risk factors include: multiple prior inpatient hospitalizations, medication noncompliance, acute psychosis, hx schizoaffective disorder, male gender, impulsivity, possible paranoia. Protective factors include: employed, lives with family, has ACT team, no current SI, no prior SA Low Acute Suicide Risk

## 2020-11-28 NOTE — ED BEHAVIORAL HEALTH NOTE - BEHAVIORAL HEALTH NOTE
Worker called [facility] and spoke to Lauro who took patient information and informed that legals and ekg need to be faxed. Legals pending. SW will follow.

## 2020-11-28 NOTE — ED PROVIDER NOTE - CARE PLAN
Principal Discharge DX:	Schizoaffective disorder   Principal Discharge DX:	Schizoaffective disorder, bipolar type  Secondary Diagnosis:	COVID-19 virus infection

## 2020-11-28 NOTE — ED ADULT NURSE REASSESSMENT NOTE - NS ED NURSE REASSESS COMMENT FT1
Pt is awaiting admission to [facility] due to positive COVID result.  Pt is asymptomatic and remains in high acuity.

## 2020-11-28 NOTE — CHART NOTE - NSCHARTNOTEFT_GEN_A_CORE
COVID Exposure Screen- Patient     1.        *Have you had a COVID-19 test in the last 21 days?  (x  ) Yes   (  ) No   (  ) Unknown- Reason: ___routine checks required at work ([employer])___  IF YES PROCEED TO QUESTION #2. IF NO OR UNKNOWN THEN PLEASE SKIP TO QUESTION #3.  2.        Date of test: __11/27/2020______  3.        3. Do you know the result? ( x ) Negative   (  ) Positive   (  ) No result available    4.        *In the past 14 days, have you been around anyone with a positive COVID-19 test?*  (  ) Yes   ( x ) No   (  ) Unknown- Reason (e.g. patient uncertain, sedated, refusing to answer, etc.):  ______  IF YES PROCEED TO QUESTION #5. IF NO or UNKNOWN, PLEASE SKIP TO QUESTION #10  5.        Were you within 6 feet of them for at least 15 minutes? (  ) Yes   (  ) No   (  ) Unknown- Reason: _____  6.        Have you provided care for them? (  ) Yes   (  ) No   (  ) Unknown- Reason: ______  7.        Have you had direct physical contact with them (touched, hugged, or kissed them)? (  ) Yes   (  ) No    (  ) Unknown- Reason: ___  8.        Have you shared eating or drinking utensils with them? (  ) Yes   (  ) No    (  ) Unknown- Reason: ____  9.        Have they sneezed, coughed, or somehow got respiratory droplets on you? (  ) Yes   (  ) No    (  ) Unknown- Reason: ______    10.     *Have you been out of [place] within the past 14 days?*  (  ) Yes   ( x ) No   (  ) Unknown- Reason (e.g. patient uncertain, sedated, refusing to answer, etc.): _______  IF YES PLEASE ANSWER THE FOLLOWING QUESTIONS:  11.     Which state/country have you been to? ______  12.     Were you there over 24 hours? (  ) Yes   (  ) No    (  ) Unknown- Reason: ______  13.     Date of return to [facility]: ______

## 2021-03-16 NOTE — ED BEHAVIORAL HEALTH ASSESSMENT NOTE - MEDICAL ISSUES AND PLAN (INCLUDE STANDING AND PRN MEDICATION)
[Immunizations are up to date] : Immunizations are up to date [Nl] : Hematologic/Lymphatic [Fever] : no fever [Rhinorrhea] : no rhinorrhea [Nasal Congestion] : no nasal congestion [Difficulty Breathing] : no dyspnea [Cough] : no cough [Vomiting] : no vomiting [Diarrhea] : no diarrhea [de-identified] : see HPI none

## 2024-02-13 NOTE — ED PROVIDER NOTE - DATE/TIME FOR CONVERSATION WITH CONSULTANT:
16.0   12.89 )-----------( 312      ( 12 Feb 2024 22:50 )             50.4   02-12    141  |  108  |  5<L>  ----------------------------<  132<H>  3.8   |  24  |  0.87    Ca    8.7      12 Feb 2024 22:50    TPro  8.6<H>  /  Alb  2.9<L>  /  TBili  1.5<H>  /  DBili  x   /  AST  138<H>  /  ALT  52  /  AlkPhos  408<H>  02-12    < from: CT Abdomen and Pelvis w/ IV Cont (02.13.24 @ 01:27) >    FINDINGS:  LOWER CHEST: Trace right pleural effusion. Bibasilar atelectasis.    LIVER: Hepatomegaly with mildly nodular hepatic contour and diffuse   heterogeneous regions of hypoenhancement  BILE DUCTS: Normal caliber.  GALLBLADDER: Collapsed and circumferentially thick-walled gallbladder   with pericholecystic edema  SPLEEN: Mild splenomegaly, 13.5cm craniocaudal  PANCREAS: Within normal limits.  ADRENALS: Within normal limits.  KIDNEYS/URETERS: Right upper pole renal cyst. No hydronephrosis on either   side.    BLADDER: Minimally distended.  REPRODUCTIVE ORGANS: Prostate within normal limits.    BOWEL: No bowel obstruction. Appendix is normal. Thick-walled loops of   small bowel in the left upper quadrant, may be related to edema in the   setting of portal hypertension or enteritis.  PERITONEUM: Small volume abdominal and pelvic free fluid. Scattered foci   of free intraperitoneal air, with the largest pocket just inferior to the   gallbladder fossa  VESSELS: Portosystemic collaterals including esophageal varices and   recanalized paraumbilical vein  RETROPERITONEUM/LYMPH NODES: No lymphadenopathy.  ABDOMINAL WALL: Within normal limits.  BONES: No acute osseous abnormality.    IMPRESSION:  Scattered foci of free intraperitoneal air, consistent with perforated   viscus.    Source is not immediately clear. The largest pocket is justinferior to   the gallbladder fossa.    Cirrhosis with sequelae of portal hypertension including portosystemic   collaterals, splenomegaly, and ascites.    Hepatomegaly with heterogeneous parenchymal enhancement with diffuse   regions of relative hypoenhancement, possibly representing a fatty or   other infiltrative process. Hepatic protocol MRI is recommended for   further evaluation.    Collapsed and circumferentially thick-walled gallbladder with   pericholecystic edema. This appearance is nonspecific and can be seen in   the setting of hepatocellular disease.    < end of copied text > 28-Nov-2020 19:15